# Patient Record
Sex: FEMALE | Race: OTHER
[De-identification: names, ages, dates, MRNs, and addresses within clinical notes are randomized per-mention and may not be internally consistent; named-entity substitution may affect disease eponyms.]

---

## 2018-05-31 ENCOUNTER — HOSPITAL ENCOUNTER (EMERGENCY)
Dept: HOSPITAL 12 - ER | Age: 43
Discharge: HOME | End: 2018-05-31
Payer: COMMERCIAL

## 2018-05-31 VITALS — SYSTOLIC BLOOD PRESSURE: 122 MMHG | DIASTOLIC BLOOD PRESSURE: 78 MMHG

## 2018-05-31 VITALS — HEIGHT: 64 IN | WEIGHT: 128 LBS | BODY MASS INDEX: 21.85 KG/M2
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
## 2018-05-31 DIAGNOSIS — S81.801A: Primary | ICD-10-CM
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
## 2018-05-31 DIAGNOSIS — L08.9: ICD-10-CM

## 2018-05-31 DIAGNOSIS — Z90.49: ICD-10-CM

## 2018-05-31 DIAGNOSIS — Z88.0: ICD-10-CM
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
## 2018-05-31 PROCEDURE — A4663 DIALYSIS BLOOD PRESSURE CUFF: HCPCS

## 2018-05-31 NOTE — NUR
Patient discharged to home in stable conditon.  Written and verbal after care 
instructions given. 

Patient verbalizes understanding of instructions. Patient able to ambulate 
unassisted with steady gait. Patient left with all personal belongings.